# Patient Record
Sex: MALE | Race: WHITE | Employment: FULL TIME | URBAN - METROPOLITAN AREA
[De-identification: names, ages, dates, MRNs, and addresses within clinical notes are randomized per-mention and may not be internally consistent; named-entity substitution may affect disease eponyms.]

---

## 2017-01-12 ENCOUNTER — OFFICE VISIT (OUTPATIENT)
Dept: INTERNAL MEDICINE CLINIC | Facility: CLINIC | Age: 22
End: 2017-01-12

## 2017-01-12 VITALS
SYSTOLIC BLOOD PRESSURE: 118 MMHG | BODY MASS INDEX: 31.01 KG/M2 | RESPIRATION RATE: 16 BRPM | HEART RATE: 68 BPM | TEMPERATURE: 99 F | WEIGHT: 221.5 LBS | DIASTOLIC BLOOD PRESSURE: 78 MMHG | HEIGHT: 71 IN

## 2017-01-12 DIAGNOSIS — Z00.00 ROUTINE GENERAL MEDICAL EXAMINATION AT A HEALTH CARE FACILITY: Primary | ICD-10-CM

## 2017-01-12 PROCEDURE — 99385 PREV VISIT NEW AGE 18-39: CPT | Performed by: INTERNAL MEDICINE

## 2017-01-12 NOTE — PROGRESS NOTES
Marie untapt  1995 is a 24year old male. Patient presents with:  Physical      HPI:   Establishing MD    No current outpatient prescriptions on file.    Past Medical History:   Diagnosis Date   • Calculus of kidney       Social History:  Chai urinary incontinence/no history of kidney disease or genital abnormalities. no Dysuria. Nocturia None. Musculoskeletal:   Patient denies arthritis , back pain, muscle weakness . Joint pain none. Joint stiffness none.   History of comminuted fracture right Testicles: unremarkable, normal-size, without masses, non tender. EXTREMITIES:   Clubbing: none. Cyanosis: absent . Edema: none. Pulses: present. Tremors: no.   Varicose veins: absent .    MUSCULOSKELETAL:   Cervical spines: normal.   L-S spines

## 2017-01-14 ENCOUNTER — LAB ENCOUNTER (OUTPATIENT)
Dept: LAB | Age: 22
End: 2017-01-14
Attending: INTERNAL MEDICINE
Payer: COMMERCIAL

## 2017-01-14 DIAGNOSIS — Z00.00 ROUTINE GENERAL MEDICAL EXAMINATION AT A HEALTH CARE FACILITY: ICD-10-CM

## 2017-01-14 LAB
ALBUMIN SERPL-MCNC: 4.9 G/DL (ref 3.5–4.8)
ALP LIVER SERPL-CCNC: 86 U/L (ref 45–117)
ALT SERPL-CCNC: 36 U/L (ref 17–63)
AST SERPL-CCNC: 17 U/L (ref 15–41)
BASOPHILS # BLD AUTO: 0.07 X10(3) UL (ref 0–0.1)
BASOPHILS NFR BLD AUTO: 1.2 %
BILIRUB SERPL-MCNC: 2.2 MG/DL (ref 0.1–2)
BILIRUB UR QL STRIP.AUTO: NEGATIVE
BUN BLD-MCNC: 10 MG/DL (ref 8–20)
CALCIUM BLD-MCNC: 8.5 MG/DL (ref 8.3–10.3)
CHLORIDE: 104 MMOL/L (ref 101–111)
CHOLEST SMN-MCNC: 111 MG/DL (ref ?–190)
CLARITY UR REFRACT.AUTO: CLEAR
CO2: 31 MMOL/L (ref 22–32)
COLOR UR AUTO: YELLOW
CREAT BLD-MCNC: 1.11 MG/DL (ref 0.7–1.3)
EOSINOPHIL # BLD AUTO: 0.11 X10(3) UL (ref 0–0.3)
EOSINOPHIL NFR BLD AUTO: 1.8 %
ERYTHROCYTE [DISTWIDTH] IN BLOOD BY AUTOMATED COUNT: 12.5 % (ref 11.5–16)
EST. AVERAGE GLUCOSE BLD GHB EST-MCNC: 91 MG/DL (ref 68–126)
GLUCOSE BLD-MCNC: 85 MG/DL (ref 70–99)
GLUCOSE UR STRIP.AUTO-MCNC: NEGATIVE MG/DL
HBA1C MFR BLD HPLC: 4.8 % (ref ?–5.7)
HCT VFR BLD AUTO: 49.5 % (ref 37–53)
HDLC SERPL-MCNC: 40 MG/DL (ref 45–?)
HDLC SERPL: 2.78 {RATIO} (ref ?–4.97)
HGB BLD-MCNC: 16.8 G/DL (ref 13–17)
IMMATURE GRANULOCYTE COUNT: 0.02 X10(3) UL (ref 0–1)
IMMATURE GRANULOCYTE RATIO %: 0.3 %
LDLC SERPL CALC-MCNC: 53 MG/DL (ref ?–120)
LEUKOCYTE ESTERASE UR QL STRIP.AUTO: NEGATIVE
LYMPHOCYTES # BLD AUTO: 2.09 X10(3) UL (ref 0.9–4)
LYMPHOCYTES NFR BLD AUTO: 34.7 %
M PROTEIN MFR SERPL ELPH: 7.2 G/DL (ref 6.1–8.3)
MCH RBC QN AUTO: 29.6 PG (ref 27–33.2)
MCHC RBC AUTO-ENTMCNC: 33.9 G/DL (ref 31–37)
MCV RBC AUTO: 87.3 FL (ref 80–99)
MONOCYTES # BLD AUTO: 0.56 X10(3) UL (ref 0.1–0.6)
MONOCYTES NFR BLD AUTO: 9.3 %
NEUTROPHIL ABS PRELIM: 3.18 X10 (3) UL (ref 1.3–6.7)
NEUTROPHILS # BLD AUTO: 3.18 X10(3) UL (ref 1.3–6.7)
NEUTROPHILS NFR BLD AUTO: 52.7 %
NITRITE UR QL STRIP.AUTO: NEGATIVE
NONHDLC SERPL-MCNC: 71 MG/DL (ref ?–150)
PH UR STRIP.AUTO: 7 [PH] (ref 4.5–8)
PLATELET # BLD AUTO: 236 10(3)UL (ref 150–450)
POTASSIUM SERPL-SCNC: 3.7 MMOL/L (ref 3.6–5.1)
PROT UR STRIP.AUTO-MCNC: NEGATIVE MG/DL
RBC # BLD AUTO: 5.67 X10(6)UL (ref 4.3–5.7)
RBC UR QL AUTO: NEGATIVE
RED CELL DISTRIBUTION WIDTH-SD: 39.3 FL (ref 35.1–46.3)
SODIUM SERPL-SCNC: 141 MMOL/L (ref 136–144)
SP GR UR STRIP.AUTO: 1.02 (ref 1–1.03)
THYROXINE (T4): 14.3 UG/DL (ref 4.5–10.9)
TRIGLYCERIDES: 92 MG/DL (ref ?–115)
TSI SER-ACNC: 0.78 MIU/ML (ref 0.35–5.5)
UROBILINOGEN UR STRIP.AUTO-MCNC: 4 MG/DL
VLDL: 18 MG/DL (ref 5–40)
WBC # BLD AUTO: 6 X10(3) UL (ref 4–13)

## 2017-01-14 PROCEDURE — 85025 COMPLETE CBC W/AUTO DIFF WBC: CPT

## 2017-01-14 PROCEDURE — 36415 COLL VENOUS BLD VENIPUNCTURE: CPT

## 2017-01-14 PROCEDURE — 84436 ASSAY OF TOTAL THYROXINE: CPT

## 2017-01-14 PROCEDURE — 80053 COMPREHEN METABOLIC PANEL: CPT

## 2017-01-14 PROCEDURE — 80061 LIPID PANEL: CPT

## 2017-01-14 PROCEDURE — 81003 URINALYSIS AUTO W/O SCOPE: CPT

## 2017-01-14 PROCEDURE — 84443 ASSAY THYROID STIM HORMONE: CPT

## 2017-01-14 PROCEDURE — 83036 HEMOGLOBIN GLYCOSYLATED A1C: CPT

## 2017-01-18 DIAGNOSIS — R79.89 ABNORMAL THYROID BLOOD TEST: Primary | ICD-10-CM

## 2017-05-31 ENCOUNTER — TELEPHONE (OUTPATIENT)
Dept: INTERNAL MEDICINE CLINIC | Facility: CLINIC | Age: 22
End: 2017-05-31

## 2017-05-31 NOTE — TELEPHONE ENCOUNTER
Let pt know that per his fathers request -if he is agreeable -he can see a psychiatrist for asperger syndrome  something that is best handled by them   Please put in an order

## 2017-06-02 ENCOUNTER — APPOINTMENT (OUTPATIENT)
Dept: GENERAL RADIOLOGY | Age: 22
End: 2017-06-02
Attending: NURSE PRACTITIONER
Payer: COMMERCIAL

## 2017-06-02 ENCOUNTER — HOSPITAL ENCOUNTER (OUTPATIENT)
Age: 22
Discharge: HOME | End: 2017-06-02
Payer: COMMERCIAL

## 2017-06-02 VITALS
HEART RATE: 75 BPM | TEMPERATURE: 98 F | BODY MASS INDEX: 30.8 KG/M2 | WEIGHT: 220 LBS | SYSTOLIC BLOOD PRESSURE: 121 MMHG | RESPIRATION RATE: 20 BRPM | DIASTOLIC BLOOD PRESSURE: 62 MMHG | HEIGHT: 71 IN | OXYGEN SATURATION: 98 %

## 2017-06-02 DIAGNOSIS — J06.9 VIRAL URI WITH COUGH: Primary | ICD-10-CM

## 2017-06-02 PROCEDURE — 71020 XR CHEST PA + LAT CHEST (CPT=71020): CPT | Performed by: NURSE PRACTITIONER

## 2017-06-02 PROCEDURE — 99213 OFFICE O/P EST LOW 20 MIN: CPT

## 2017-06-02 PROCEDURE — 99204 OFFICE O/P NEW MOD 45 MIN: CPT

## 2017-06-02 RX ORDER — BENZONATATE 100 MG/1
100 CAPSULE ORAL 3 TIMES DAILY PRN
Qty: 30 CAPSULE | Refills: 0 | Status: SHIPPED | OUTPATIENT
Start: 2017-06-02 | End: 2017-07-02

## 2017-06-02 RX ORDER — ALBUTEROL SULFATE 90 UG/1
2 AEROSOL, METERED RESPIRATORY (INHALATION) EVERY 4 HOURS PRN
Qty: 1 INHALER | Refills: 0 | Status: SHIPPED | OUTPATIENT
Start: 2017-06-02 | End: 2017-07-02

## 2017-06-02 NOTE — ED INITIAL ASSESSMENT (HPI)
Pt presents to the immediate care due to cough for the last week. Pt states he is having yellow to clear sputum production. Denies fever.

## 2017-06-03 NOTE — ED PROVIDER NOTES
Patient Seen in: [facility] [facility]    History   Patient presents with:  Cough/URI    Stated Complaint: cough    HPI  21-year-old male who presents to the immediate care with complaints of cough for 1 week with no known fevers.   Patient for dizziness and headaches. Hematological: Negative. Psychiatric/Behavioral: Negative. All other systems reviewed and are negative. Positive for stated complaint: cough  Other systems are as noted in HPI.   Constitutional and vital signs revie STATED HISTORY: (As transcribed by Technologist)  Patient states that he has had a productive cough for one week. FINDINGS:  LUNGS:  No focal consolidation. Normal vascularity. CARDIAC:  Normal size cardiac silhouette.  MEDIASTINUM:  Normal. PLEURA:  No

## 2017-07-24 ENCOUNTER — LAB ENCOUNTER (OUTPATIENT)
Dept: LAB | Age: 22
End: 2017-07-24
Attending: INTERNAL MEDICINE
Payer: COMMERCIAL

## 2017-07-24 ENCOUNTER — OFFICE VISIT (OUTPATIENT)
Dept: INTERNAL MEDICINE CLINIC | Facility: CLINIC | Age: 22
End: 2017-07-24

## 2017-07-24 VITALS
WEIGHT: 195 LBS | SYSTOLIC BLOOD PRESSURE: 122 MMHG | OXYGEN SATURATION: 97 % | DIASTOLIC BLOOD PRESSURE: 82 MMHG | RESPIRATION RATE: 16 BRPM | BODY MASS INDEX: 27.92 KG/M2 | HEART RATE: 109 BPM | HEIGHT: 70 IN

## 2017-07-24 DIAGNOSIS — R79.89 ABNORMAL THYROID BLOOD TEST: ICD-10-CM

## 2017-07-24 DIAGNOSIS — Z13.30 SCREENING FOR MENTAL DISEASE/DEVELOPMENTAL DISORDER: ICD-10-CM

## 2017-07-24 DIAGNOSIS — R79.89 ABNORMAL THYROID BLOOD TEST: Primary | ICD-10-CM

## 2017-07-24 DIAGNOSIS — Z13.42 SCREENING FOR MENTAL DISEASE/DEVELOPMENTAL DISORDER: ICD-10-CM

## 2017-07-24 LAB
FREE T4: 1.6 NG/DL (ref 0.9–1.8)
T3FREE SERPL-MCNC: 3.4 PG/ML (ref 2.3–4.2)
TSI SER-ACNC: 0.56 MIU/ML (ref 0.35–5.5)

## 2017-07-24 PROCEDURE — 84439 ASSAY OF FREE THYROXINE: CPT

## 2017-07-24 PROCEDURE — 36415 COLL VENOUS BLD VENIPUNCTURE: CPT

## 2017-07-24 PROCEDURE — 84481 FREE ASSAY (FT-3): CPT

## 2017-07-24 PROCEDURE — 84443 ASSAY THYROID STIM HORMONE: CPT

## 2017-07-24 PROCEDURE — 99213 OFFICE O/P EST LOW 20 MIN: CPT | Performed by: INTERNAL MEDICINE

## 2017-07-24 NOTE — PROGRESS NOTES
Junior Scott  1995 is a 24year old male. Patient presents with: Follow - Up      HPI:   Here for lab discussion. No current outpatient prescriptions on file.    Past Medical History:   Diagnosis Date   • Calculus of kidney       Social Hi

## 2018-02-05 ENCOUNTER — TELEPHONE (OUTPATIENT)
Dept: INTERNAL MEDICINE CLINIC | Facility: CLINIC | Age: 23
End: 2018-02-05

## 2018-02-05 NOTE — TELEPHONE ENCOUNTER
Patient's father called - he was transferred by [facility] [place] Referral Dept to us for a behavioral health referral.  He was seen by Dr Fede Encarnacion and recommended to be tested for autism; requested a referral to [facility] in chart.   Please call father when approved-did

## 2018-02-05 NOTE — TELEPHONE ENCOUNTER
Jody Monson stated that he is looking for psych referral to Dr Ann Basilio through [facility]. Informed that psych referrals are not in need of referrals and he can contact Dr Scott Richards office to make sure he is in his network and then schedule appointment.

## 2018-03-14 NOTE — TELEPHONE ENCOUNTER
Patient's father informed waiting for Dr. Tan Hudson to [facility] referral, will call tomorrow with update.  Perry Simon (father) verbalized understanding

## 2018-03-14 NOTE — TELEPHONE ENCOUNTER
Patient's Father (T: [phone])  calling in, stated he scheduled an appointment for his Son Sheba Roy to see Dr. Libby Stein, however Dr. James Dubois office is requesting a referral, since it is psych related vs behavioral health.       Please call pt's Father

## 2018-03-15 NOTE — TELEPHONE ENCOUNTER
Left detailed message, order signed by Dr. Doroteo Pleitez, , proceed with appointment with Dr. Timmy Roy via

## 2018-06-05 NOTE — PROGRESS NOTES
Demetrio Charlton  1995 is a 20year old male. No chief complaint on file. Patient here for referral to see a psychiatrist did see a psychologist those records are not available for review.   Apparently has been diagnosed with depression and a

## 2018-07-05 PROBLEM — Z86.73 HISTORY OF TRANSIENT ISCHEMIC ATTACK (TIA): Status: ACTIVE | Noted: 2018-07-03

## 2018-07-05 PROBLEM — F34.1 PERSISTENT DEPRESSIVE DISORDER: Status: ACTIVE | Noted: 2018-07-03

## 2018-07-05 PROBLEM — F84.0 AUTISM SPECTRUM DISORDER WITHOUT ACCOMPANYING INTELLECTUAL IMPAIRMENT, REQUIRING SUPPORT (LEVEL 1): Status: ACTIVE | Noted: 2018-07-03

## 2018-07-05 PROBLEM — F32.9 MAJOR DEPRESSIVE DISORDER: Status: ACTIVE | Noted: 2018-07-03

## 2018-07-05 PROBLEM — F84.0: Status: ACTIVE | Noted: 2018-07-03

## 2018-07-05 PROBLEM — F80.82 SOCIAL PRAGMATIC COMMUNICATION DISORDER: Status: ACTIVE | Noted: 2018-07-03

## 2018-07-10 ENCOUNTER — APPOINTMENT (OUTPATIENT)
Dept: LAB | Age: 23
End: 2018-07-10
Attending: INTERNAL MEDICINE
Payer: COMMERCIAL

## 2018-07-10 PROCEDURE — 36415 COLL VENOUS BLD VENIPUNCTURE: CPT | Performed by: PHYSICIAN ASSISTANT

## 2018-07-10 PROCEDURE — 85025 COMPLETE CBC W/AUTO DIFF WBC: CPT | Performed by: PHYSICIAN ASSISTANT

## 2018-07-10 PROCEDURE — 84439 ASSAY OF FREE THYROXINE: CPT | Performed by: PHYSICIAN ASSISTANT

## 2018-07-10 PROCEDURE — 84443 ASSAY THYROID STIM HORMONE: CPT | Performed by: PHYSICIAN ASSISTANT

## 2018-07-10 PROCEDURE — 82306 VITAMIN D 25 HYDROXY: CPT | Performed by: PHYSICIAN ASSISTANT

## 2018-07-10 PROCEDURE — 80053 COMPREHEN METABOLIC PANEL: CPT | Performed by: PHYSICIAN ASSISTANT

## 2018-07-16 PROBLEM — R79.89 LOW VITAMIN D LEVEL: Status: ACTIVE | Noted: 2018-07-16

## 2018-09-20 ENCOUNTER — APPOINTMENT (OUTPATIENT)
Dept: LAB | Age: 23
End: 2018-09-20
Attending: PHYSICIAN ASSISTANT
Payer: COMMERCIAL

## 2018-09-20 PROCEDURE — 82306 VITAMIN D 25 HYDROXY: CPT | Performed by: PHYSICIAN ASSISTANT

## 2018-09-20 PROCEDURE — 36415 COLL VENOUS BLD VENIPUNCTURE: CPT | Performed by: PHYSICIAN ASSISTANT

## 2019-01-10 ENCOUNTER — OFFICE VISIT (OUTPATIENT)
Dept: INTERNAL MEDICINE CLINIC | Facility: CLINIC | Age: 24
End: 2019-01-10
Payer: COMMERCIAL

## 2019-01-10 VITALS
RESPIRATION RATE: 16 BRPM | TEMPERATURE: 99 F | HEART RATE: 95 BPM | OXYGEN SATURATION: 98 % | DIASTOLIC BLOOD PRESSURE: 78 MMHG | SYSTOLIC BLOOD PRESSURE: 112 MMHG | WEIGHT: 195.25 LBS | HEIGHT: 70 IN | BODY MASS INDEX: 27.95 KG/M2

## 2019-01-10 DIAGNOSIS — R05.9 COUGH: Primary | ICD-10-CM

## 2019-01-10 PROCEDURE — 99213 OFFICE O/P EST LOW 20 MIN: CPT | Performed by: INTERNAL MEDICINE

## 2019-01-10 RX ORDER — LEVOFLOXACIN 500 MG/1
500 TABLET, FILM COATED ORAL DAILY
Qty: 10 TABLET | Refills: 0 | Status: SHIPPED | OUTPATIENT
Start: 2019-01-10 | End: 2019-01-20

## 2019-01-10 RX ORDER — CODEINE PHOSPHATE AND GUAIFENESIN 10; 100 MG/5ML; MG/5ML
5 SOLUTION ORAL EVERY 6 HOURS PRN
Qty: 240 ML | Refills: 0 | Status: SHIPPED | OUTPATIENT
Start: 2019-01-10 | End: 2019-01-20

## 2019-01-10 RX ORDER — PREDNISONE 1 MG/1
TABLET ORAL
Qty: 36 TABLET | Refills: 0 | Status: SHIPPED | OUTPATIENT
Start: 2019-01-10 | End: 2019-04-02 | Stop reason: ALTCHOICE

## 2019-01-10 NOTE — PROGRESS NOTES
Adonis Torrez  1995 is a 24year old male who presents for upper respiratory symptoms    Patient presents with:  Cough        HPI:   Pt reports  respiratory symptoms for 2 days cough with yellowish expectoration yellowish nasal discharge  .        C GENERAL: well developed, well nourished,in no apparent distress  SKIN: no rashes,no suspicious lesions  EYES:PERRLA, EOMI intact,conjunctiva are clear  ENT: ears neg throat postynasal drip  NECK: supple, neg adenopathy,no bruits  LUNGS: clear to ausculta

## 2019-06-26 ENCOUNTER — APPOINTMENT (OUTPATIENT)
Dept: LAB | Age: 24
End: 2019-06-26
Attending: PHYSICIAN ASSISTANT
Payer: COMMERCIAL

## 2019-06-26 DIAGNOSIS — E55.9 VITAMIN D DEFICIENCY: ICD-10-CM

## 2019-06-26 PROCEDURE — 82306 VITAMIN D 25 HYDROXY: CPT

## 2019-06-26 PROCEDURE — 36415 COLL VENOUS BLD VENIPUNCTURE: CPT

## 2019-12-06 ENCOUNTER — LAB ENCOUNTER (OUTPATIENT)
Dept: LAB | Age: 24
End: 2019-12-06
Attending: INTERNAL MEDICINE
Payer: COMMERCIAL

## 2019-12-06 DIAGNOSIS — R79.89 LOW VITAMIN D LEVEL: ICD-10-CM

## 2019-12-06 PROCEDURE — 82306 VITAMIN D 25 HYDROXY: CPT

## 2019-12-06 PROCEDURE — 36415 COLL VENOUS BLD VENIPUNCTURE: CPT

## 2019-12-06 NOTE — PROGRESS NOTES
Vitamin D has normalized with vitamin D repletion. Writer will call the patient to inform him of the result.

## 2020-02-04 PROBLEM — Z86.39 HISTORY OF VITAMIN D DEFICIENCY: Status: ACTIVE | Noted: 2018-07-16

## 2022-04-27 PROBLEM — F34.1 PERSISTENT DEPRESSIVE DISORDER: Chronic | Status: ACTIVE | Noted: 2018-07-03

## 2022-04-27 PROBLEM — F84.0 AUTISM SPECTRUM DISORDER WITHOUT ACCOMPANYING INTELLECTUAL IMPAIRMENT, REQUIRING SUPPORT (LEVEL 1): Chronic | Status: ACTIVE | Noted: 2018-07-03

## 2022-04-27 PROBLEM — F84.0: Chronic | Status: ACTIVE | Noted: 2018-07-03

## 2022-04-27 PROBLEM — F32.9 MAJOR DEPRESSIVE DISORDER: Chronic | Status: ACTIVE | Noted: 2018-07-03

## 2022-04-27 PROBLEM — F41.9 ANXIETY: Status: ACTIVE | Noted: 2022-04-27

## 2022-08-24 PROBLEM — F84.0 AUTISM SPECTRUM DISORDER: Chronic | Status: ACTIVE | Noted: 2018-07-03

## 2022-08-24 PROBLEM — F84.0 AUTISM SPECTRUM DISORDER (HCC): Chronic | Status: ACTIVE | Noted: 2018-07-03

## 2022-08-24 PROBLEM — F84.0 AUTISM SPECTRUM DISORDER: Status: ACTIVE | Noted: 2018-07-03

## 2022-08-24 PROBLEM — F84.0 AUTISM SPECTRUM DISORDER (HCC): Status: ACTIVE | Noted: 2018-07-03

## 2023-03-16 ENCOUNTER — OFFICE VISIT (OUTPATIENT)
Dept: INTERNAL MEDICINE CLINIC | Facility: CLINIC | Age: 28
End: 2023-03-16
Payer: COMMERCIAL

## 2023-03-16 VITALS
OXYGEN SATURATION: 99 % | WEIGHT: 247.81 LBS | RESPIRATION RATE: 16 BRPM | BODY MASS INDEX: 34.69 KG/M2 | DIASTOLIC BLOOD PRESSURE: 70 MMHG | TEMPERATURE: 97 F | SYSTOLIC BLOOD PRESSURE: 134 MMHG | HEIGHT: 71 IN | HEART RATE: 91 BPM

## 2023-03-16 DIAGNOSIS — R23.8 SKIN IRRITATION: Primary | ICD-10-CM

## 2023-03-16 PROCEDURE — 99213 OFFICE O/P EST LOW 20 MIN: CPT | Performed by: INTERNAL MEDICINE

## 2023-03-16 PROCEDURE — 3008F BODY MASS INDEX DOCD: CPT | Performed by: INTERNAL MEDICINE

## 2023-03-16 PROCEDURE — 3078F DIAST BP <80 MM HG: CPT | Performed by: INTERNAL MEDICINE

## 2023-03-16 PROCEDURE — 3075F SYST BP GE 130 - 139MM HG: CPT | Performed by: INTERNAL MEDICINE

## 2023-03-16 RX ORDER — TRIAMCINOLONE ACETONIDE 1 MG/G
CREAM TOPICAL 2 TIMES DAILY
Qty: 80 G | Refills: 1 | Status: SHIPPED | OUTPATIENT
Start: 2023-03-16

## 2023-03-16 NOTE — PATIENT INSTRUCTIONS
- Apply triamcinolone cream to the shin and ear areas twice daily  - Follow up with Dermatology (Dr. Elizabeth Patel or Dr. Kieran Reese - see printouts). I would follow up with them even if the cream helps/the rashes go away. It was a pleasure seeing you in the clinic today. Thank you for choosing the [facility] office for your healthcare needs. Please call at [phone] with any questions or concerns.     Maame Bentley MD

## 2023-03-24 ENCOUNTER — OFFICE VISIT (OUTPATIENT)
Dept: INTERNAL MEDICINE CLINIC | Facility: CLINIC | Age: 28
End: 2023-03-24
Payer: COMMERCIAL

## 2023-03-24 ENCOUNTER — LAB ENCOUNTER (OUTPATIENT)
Dept: LAB | Age: 28
End: 2023-03-24
Attending: INTERNAL MEDICINE
Payer: COMMERCIAL

## 2023-03-24 VITALS
DIASTOLIC BLOOD PRESSURE: 80 MMHG | TEMPERATURE: 98 F | BODY MASS INDEX: 34.49 KG/M2 | HEART RATE: 82 BPM | WEIGHT: 246.38 LBS | HEIGHT: 70.75 IN | OXYGEN SATURATION: 98 % | SYSTOLIC BLOOD PRESSURE: 114 MMHG | RESPIRATION RATE: 16 BRPM

## 2023-03-24 DIAGNOSIS — G47.33 OSA (OBSTRUCTIVE SLEEP APNEA): Primary | ICD-10-CM

## 2023-03-24 DIAGNOSIS — E66.9 OBESITY (BMI 30-39.9): ICD-10-CM

## 2023-03-24 DIAGNOSIS — Z00.00 LABORATORY EXAMINATION ORDERED AS PART OF A ROUTINE GENERAL MEDICAL EXAMINATION: ICD-10-CM

## 2023-03-24 DIAGNOSIS — G47.33 OSA (OBSTRUCTIVE SLEEP APNEA): ICD-10-CM

## 2023-03-24 LAB
ALBUMIN SERPL-MCNC: 4.4 G/DL (ref 3.4–5)
ALBUMIN/GLOB SERPL: 1.5 {RATIO} (ref 1–2)
ALP LIVER SERPL-CCNC: 98 U/L
ALT SERPL-CCNC: 52 U/L
ANION GAP SERPL CALC-SCNC: 6 MMOL/L (ref 0–18)
AST SERPL-CCNC: 17 U/L (ref 15–37)
BASOPHILS # BLD AUTO: 0.1 X10(3) UL (ref 0–0.2)
BASOPHILS NFR BLD AUTO: 1.1 %
BILIRUB SERPL-MCNC: 1 MG/DL (ref 0.1–2)
BILIRUB UR QL STRIP.AUTO: NEGATIVE
BUN BLD-MCNC: 15 MG/DL (ref 7–18)
CALCIUM BLD-MCNC: 8.9 MG/DL (ref 8.5–10.1)
CHLORIDE SERPL-SCNC: 108 MMOL/L (ref 98–112)
CHOLEST SERPL-MCNC: 155 MG/DL (ref ?–200)
CLARITY UR REFRACT.AUTO: CLEAR
CO2 SERPL-SCNC: 29 MMOL/L (ref 21–32)
COLOR UR AUTO: YELLOW
CREAT BLD-MCNC: 1.11 MG/DL
EOSINOPHIL # BLD AUTO: 0.51 X10(3) UL (ref 0–0.7)
EOSINOPHIL NFR BLD AUTO: 5.8 %
ERYTHROCYTE [DISTWIDTH] IN BLOOD BY AUTOMATED COUNT: 12.4 %
EST. AVERAGE GLUCOSE BLD GHB EST-MCNC: 97 MG/DL (ref 68–126)
FASTING PATIENT LIPID ANSWER: YES
FASTING STATUS PATIENT QL REPORTED: YES
GFR SERPLBLD BASED ON 1.73 SQ M-ARVRAT: 93 ML/MIN/1.73M2 (ref 60–?)
GLOBULIN PLAS-MCNC: 2.9 G/DL (ref 2.8–4.4)
GLUCOSE BLD-MCNC: 109 MG/DL (ref 70–99)
GLUCOSE UR STRIP.AUTO-MCNC: NEGATIVE MG/DL
HBA1C MFR BLD: 5 % (ref ?–5.7)
HCT VFR BLD AUTO: 49.5 %
HDLC SERPL-MCNC: 40 MG/DL (ref 40–59)
HGB BLD-MCNC: 16.4 G/DL
IMM GRANULOCYTES # BLD AUTO: 0.03 X10(3) UL (ref 0–1)
IMM GRANULOCYTES NFR BLD: 0.3 %
INSULIN SERPL-ACNC: 20.5 MU/L (ref 3–25)
KETONES UR STRIP.AUTO-MCNC: NEGATIVE MG/DL
LDLC SERPL CALC-MCNC: 92 MG/DL (ref ?–100)
LEUKOCYTE ESTERASE UR QL STRIP.AUTO: NEGATIVE
LYMPHOCYTES # BLD AUTO: 2.36 X10(3) UL (ref 1–4)
LYMPHOCYTES NFR BLD AUTO: 26.7 %
MCH RBC QN AUTO: 28.7 PG (ref 26–34)
MCHC RBC AUTO-ENTMCNC: 33.1 G/DL (ref 31–37)
MCV RBC AUTO: 86.7 FL
MONOCYTES # BLD AUTO: 0.85 X10(3) UL (ref 0.1–1)
MONOCYTES NFR BLD AUTO: 9.6 %
NEUTROPHILS # BLD AUTO: 5 X10 (3) UL (ref 1.5–7.7)
NEUTROPHILS # BLD AUTO: 5 X10(3) UL (ref 1.5–7.7)
NEUTROPHILS NFR BLD AUTO: 56.5 %
NITRITE UR QL STRIP.AUTO: NEGATIVE
NONHDLC SERPL-MCNC: 115 MG/DL (ref ?–130)
OSMOLALITY SERPL CALC.SUM OF ELEC: 297 MOSM/KG (ref 275–295)
PH UR STRIP.AUTO: 5 [PH] (ref 5–8)
PLATELET # BLD AUTO: 243 10(3)UL (ref 150–450)
POTASSIUM SERPL-SCNC: 3.8 MMOL/L (ref 3.5–5.1)
PROT SERPL-MCNC: 7.3 G/DL (ref 6.4–8.2)
PROT UR STRIP.AUTO-MCNC: NEGATIVE MG/DL
RBC # BLD AUTO: 5.71 X10(6)UL
RBC UR QL AUTO: NEGATIVE
SODIUM SERPL-SCNC: 143 MMOL/L (ref 136–145)
SP GR UR STRIP.AUTO: 1.03 (ref 1–1.03)
TRIGL SERPL-MCNC: 129 MG/DL (ref 30–149)
TSI SER-ACNC: 2.01 MIU/ML (ref 0.36–3.74)
UROBILINOGEN UR STRIP.AUTO-MCNC: 4 MG/DL
VIT D+METAB SERPL-MCNC: 18.9 NG/ML (ref 30–100)
VLDLC SERPL CALC-MCNC: 21 MG/DL (ref 0–30)
WBC # BLD AUTO: 8.9 X10(3) UL (ref 4–11)

## 2023-03-24 PROCEDURE — 84443 ASSAY THYROID STIM HORMONE: CPT

## 2023-03-24 PROCEDURE — 81003 URINALYSIS AUTO W/O SCOPE: CPT

## 2023-03-24 PROCEDURE — 82306 VITAMIN D 25 HYDROXY: CPT

## 2023-03-24 PROCEDURE — 83036 HEMOGLOBIN GLYCOSYLATED A1C: CPT

## 2023-03-24 PROCEDURE — 80061 LIPID PANEL: CPT

## 2023-03-24 PROCEDURE — 80053 COMPREHEN METABOLIC PANEL: CPT

## 2023-03-24 PROCEDURE — 99203 OFFICE O/P NEW LOW 30 MIN: CPT | Performed by: INTERNAL MEDICINE

## 2023-03-24 PROCEDURE — 3074F SYST BP LT 130 MM HG: CPT | Performed by: INTERNAL MEDICINE

## 2023-03-24 PROCEDURE — 85025 COMPLETE CBC W/AUTO DIFF WBC: CPT

## 2023-03-24 PROCEDURE — 3079F DIAST BP 80-89 MM HG: CPT | Performed by: INTERNAL MEDICINE

## 2023-03-24 PROCEDURE — 36415 COLL VENOUS BLD VENIPUNCTURE: CPT

## 2023-03-24 PROCEDURE — 83525 ASSAY OF INSULIN: CPT

## 2023-03-24 PROCEDURE — 3008F BODY MASS INDEX DOCD: CPT | Performed by: INTERNAL MEDICINE

## 2023-03-24 NOTE — PATIENT INSTRUCTIONS
Patient will be seen in the office on Monday for a CPX.   As an addendum at the medical assistant result that a complete evaluation could not be done during this visit

## 2023-03-27 ENCOUNTER — OFFICE VISIT (OUTPATIENT)
Dept: INTERNAL MEDICINE CLINIC | Facility: CLINIC | Age: 28
End: 2023-03-27
Payer: COMMERCIAL

## 2023-03-27 VITALS
DIASTOLIC BLOOD PRESSURE: 88 MMHG | RESPIRATION RATE: 16 BRPM | HEIGHT: 70.75 IN | OXYGEN SATURATION: 99 % | HEART RATE: 85 BPM | BODY MASS INDEX: 34.6 KG/M2 | WEIGHT: 247.19 LBS | SYSTOLIC BLOOD PRESSURE: 130 MMHG | TEMPERATURE: 98 F

## 2023-03-27 DIAGNOSIS — G47.33 OSA (OBSTRUCTIVE SLEEP APNEA): ICD-10-CM

## 2023-03-27 DIAGNOSIS — L40.9 PSORIASIS: ICD-10-CM

## 2023-03-27 DIAGNOSIS — Z00.00 ROUTINE GENERAL MEDICAL EXAMINATION AT A HEALTH CARE FACILITY: Primary | ICD-10-CM

## 2023-03-27 DIAGNOSIS — Z86.39 HISTORY OF VITAMIN D DEFICIENCY: ICD-10-CM

## 2023-03-27 DIAGNOSIS — E66.9 OBESITY (BMI 30-39.9): ICD-10-CM

## 2023-03-27 PROCEDURE — 3079F DIAST BP 80-89 MM HG: CPT | Performed by: INTERNAL MEDICINE

## 2023-03-27 PROCEDURE — 3075F SYST BP GE 130 - 139MM HG: CPT | Performed by: INTERNAL MEDICINE

## 2023-03-27 PROCEDURE — 3008F BODY MASS INDEX DOCD: CPT | Performed by: INTERNAL MEDICINE

## 2023-03-27 PROCEDURE — 99395 PREV VISIT EST AGE 18-39: CPT | Performed by: INTERNAL MEDICINE

## 2023-07-03 ENCOUNTER — OFFICE VISIT (OUTPATIENT)
Dept: INTERNAL MEDICINE CLINIC | Facility: CLINIC | Age: 28
End: 2023-07-03
Payer: COMMERCIAL

## 2023-07-03 VITALS
RESPIRATION RATE: 16 BRPM | HEART RATE: 86 BPM | SYSTOLIC BLOOD PRESSURE: 124 MMHG | BODY MASS INDEX: 33.64 KG/M2 | OXYGEN SATURATION: 98 % | HEIGHT: 70 IN | WEIGHT: 235 LBS | DIASTOLIC BLOOD PRESSURE: 80 MMHG

## 2023-07-03 DIAGNOSIS — R73.01 IFG (IMPAIRED FASTING GLUCOSE): ICD-10-CM

## 2023-07-03 DIAGNOSIS — F32.A ANXIETY AND DEPRESSION: ICD-10-CM

## 2023-07-03 DIAGNOSIS — F41.9 ANXIETY AND DEPRESSION: ICD-10-CM

## 2023-07-03 DIAGNOSIS — E55.9 VITAMIN D DEFICIENCY: ICD-10-CM

## 2023-07-03 DIAGNOSIS — Z51.81 ENCOUNTER FOR THERAPEUTIC DRUG LEVEL MONITORING: Primary | ICD-10-CM

## 2023-07-03 DIAGNOSIS — E66.9 CLASS 1 OBESITY WITH BODY MASS INDEX (BMI) OF 33.0 TO 33.9 IN ADULT, UNSPECIFIED OBESITY TYPE, UNSPECIFIED WHETHER SERIOUS COMORBIDITY PRESENT: ICD-10-CM

## 2023-07-03 DIAGNOSIS — F84.0 AUTISM SPECTRUM DISORDER: Chronic | ICD-10-CM

## 2023-07-03 PROCEDURE — 99204 OFFICE O/P NEW MOD 45 MIN: CPT | Performed by: PHYSICIAN ASSISTANT

## 2023-07-03 PROCEDURE — 3074F SYST BP LT 130 MM HG: CPT | Performed by: PHYSICIAN ASSISTANT

## 2023-07-03 PROCEDURE — 3079F DIAST BP 80-89 MM HG: CPT | Performed by: PHYSICIAN ASSISTANT

## 2023-07-03 PROCEDURE — 3008F BODY MASS INDEX DOCD: CPT | Performed by: PHYSICIAN ASSISTANT

## 2023-10-03 ENCOUNTER — OFFICE VISIT (OUTPATIENT)
Dept: INTERNAL MEDICINE CLINIC | Facility: CLINIC | Age: 28
End: 2023-10-03
Payer: COMMERCIAL

## 2023-10-03 VITALS
BODY MASS INDEX: 32.93 KG/M2 | WEIGHT: 230 LBS | HEART RATE: 83 BPM | RESPIRATION RATE: 16 BRPM | TEMPERATURE: 98 F | OXYGEN SATURATION: 99 % | HEIGHT: 70 IN | SYSTOLIC BLOOD PRESSURE: 130 MMHG | DIASTOLIC BLOOD PRESSURE: 76 MMHG

## 2023-10-03 DIAGNOSIS — L30.8 PSORIASIFORM DERMATITIS: Primary | ICD-10-CM

## 2023-10-03 DIAGNOSIS — R23.8 SKIN IRRITATION: ICD-10-CM

## 2023-10-03 PROCEDURE — 3075F SYST BP GE 130 - 139MM HG: CPT | Performed by: INTERNAL MEDICINE

## 2023-10-03 PROCEDURE — 3008F BODY MASS INDEX DOCD: CPT | Performed by: INTERNAL MEDICINE

## 2023-10-03 PROCEDURE — 99213 OFFICE O/P EST LOW 20 MIN: CPT | Performed by: INTERNAL MEDICINE

## 2023-10-03 PROCEDURE — 3078F DIAST BP <80 MM HG: CPT | Performed by: INTERNAL MEDICINE

## 2023-10-03 RX ORDER — TRIAMCINOLONE ACETONIDE 1 MG/G
CREAM TOPICAL 2 TIMES DAILY
Qty: 80 G | Refills: 1 | Status: SHIPPED | OUTPATIENT
Start: 2023-10-03

## 2023-10-30 ENCOUNTER — OFFICE VISIT (OUTPATIENT)
Dept: INTERNAL MEDICINE CLINIC | Facility: CLINIC | Age: 28
End: 2023-10-30

## 2023-10-30 VITALS
SYSTOLIC BLOOD PRESSURE: 112 MMHG | DIASTOLIC BLOOD PRESSURE: 72 MMHG | BODY MASS INDEX: 31.78 KG/M2 | RESPIRATION RATE: 16 BRPM | HEIGHT: 70 IN | WEIGHT: 222 LBS | HEART RATE: 78 BPM

## 2023-10-30 DIAGNOSIS — F32.A ANXIETY AND DEPRESSION: ICD-10-CM

## 2023-10-30 DIAGNOSIS — E66.9 CLASS 1 OBESITY WITH BODY MASS INDEX (BMI) OF 31.0 TO 31.9 IN ADULT, UNSPECIFIED OBESITY TYPE, UNSPECIFIED WHETHER SERIOUS COMORBIDITY PRESENT: ICD-10-CM

## 2023-10-30 DIAGNOSIS — E55.9 VITAMIN D DEFICIENCY: ICD-10-CM

## 2023-10-30 DIAGNOSIS — Z51.81 ENCOUNTER FOR THERAPEUTIC DRUG LEVEL MONITORING: Primary | ICD-10-CM

## 2023-10-30 DIAGNOSIS — R73.01 IFG (IMPAIRED FASTING GLUCOSE): ICD-10-CM

## 2023-10-30 DIAGNOSIS — F41.9 ANXIETY AND DEPRESSION: ICD-10-CM

## 2023-10-30 PROCEDURE — 3074F SYST BP LT 130 MM HG: CPT | Performed by: PHYSICIAN ASSISTANT

## 2023-10-30 PROCEDURE — 99213 OFFICE O/P EST LOW 20 MIN: CPT | Performed by: PHYSICIAN ASSISTANT

## 2023-10-30 PROCEDURE — 3008F BODY MASS INDEX DOCD: CPT | Performed by: PHYSICIAN ASSISTANT

## 2023-10-30 PROCEDURE — 3078F DIAST BP <80 MM HG: CPT | Performed by: PHYSICIAN ASSISTANT

## 2024-02-23 ENCOUNTER — OFFICE VISIT (OUTPATIENT)
Dept: INTERNAL MEDICINE CLINIC | Facility: CLINIC | Age: 29
End: 2024-02-23
Payer: COMMERCIAL

## 2024-02-23 VITALS
BODY MASS INDEX: 31.07 KG/M2 | RESPIRATION RATE: 16 BRPM | WEIGHT: 217 LBS | SYSTOLIC BLOOD PRESSURE: 110 MMHG | HEIGHT: 70 IN | HEART RATE: 86 BPM | DIASTOLIC BLOOD PRESSURE: 62 MMHG

## 2024-02-23 DIAGNOSIS — F41.9 ANXIETY AND DEPRESSION: ICD-10-CM

## 2024-02-23 DIAGNOSIS — F32.A ANXIETY AND DEPRESSION: ICD-10-CM

## 2024-02-23 DIAGNOSIS — Z51.81 ENCOUNTER FOR THERAPEUTIC DRUG LEVEL MONITORING: Primary | ICD-10-CM

## 2024-02-23 DIAGNOSIS — E55.9 VITAMIN D DEFICIENCY: ICD-10-CM

## 2024-02-23 DIAGNOSIS — E66.9 CLASS 1 OBESITY WITH BODY MASS INDEX (BMI) OF 31.0 TO 31.9 IN ADULT, UNSPECIFIED OBESITY TYPE, UNSPECIFIED WHETHER SERIOUS COMORBIDITY PRESENT: ICD-10-CM

## 2024-02-23 DIAGNOSIS — R73.01 IFG (IMPAIRED FASTING GLUCOSE): ICD-10-CM

## 2024-02-23 PROCEDURE — 3008F BODY MASS INDEX DOCD: CPT | Performed by: PHYSICIAN ASSISTANT

## 2024-02-23 PROCEDURE — 3078F DIAST BP <80 MM HG: CPT | Performed by: PHYSICIAN ASSISTANT

## 2024-02-23 PROCEDURE — 3074F SYST BP LT 130 MM HG: CPT | Performed by: PHYSICIAN ASSISTANT

## 2024-02-23 PROCEDURE — 99213 OFFICE O/P EST LOW 20 MIN: CPT | Performed by: PHYSICIAN ASSISTANT

## 2024-02-23 NOTE — PROGRESS NOTES
HISTORY OF PRESENT ILLNESS  Chief Complaint   Patient presents with    Weight Check     -5       Jeremy Downs is a 28 year old male here for follow up in medical weight loss program.   -5lbs  No AOM  Denies chest pain, shortness of breath, dizziness, blurred vision, headache, paresthesia, nausea/vomiting.   Protein bar  Active throughout the day taking car of animals  Helping to build a shed  Has not been eating out as much, smaller portion size  Exercise/Activity: not great with the weather, but now that the weather is getting better is going to start walking again  Nutrition: 24 hour food log reviewed, eating regular meals, +protein  Stress: manageable  Sleep: improved, getting a lot better      Wt Readings from Last 6 Encounters:   02/23/24 217 lb (98.4 kg)   10/30/23 222 lb (100.7 kg)   10/03/23 230 lb (104.3 kg)   07/03/23 235 lb (106.6 kg)   03/27/23 247 lb 3.2 oz (112.1 kg)   03/24/23 246 lb 6.4 oz (111.8 kg)            Breakfast Lunch Dinner Snacks Fluids   Reviewed           REVIEW OF SYSTEMS  GENERAL HEALTH: feels well otherwise, denied any fevers chills or night sweats   RESPIRATORY: denies shortness of breath   CARDIOVASCULAR: denies chest pain  GI: denies abdominal pain    EXAM  /62   Pulse 86   Resp 16   Ht 5' 10\" (1.778 m)   Wt 217 lb (98.4 kg)   BMI 31.14 kg/m²   GENERAL: well developed, well nourished,in no apparent distress, A/O x3  SKIN: no rashes,no suspicious lesions  HEENT: atraumatic, normocephalic, OP-clear, PERRL  NECK: supple,no adenopathy  LUNGS: clear to auscultation bilaterally   CARDIO: RRR without murmur  EXTREMITIES: no cyanosis, no clubbing, no edema    Lab Results   Component Value Date    WBC 8.9 03/24/2023    RBC 5.71 (H) 03/24/2023    HGB 16.4 03/24/2023    HCT 49.5 03/24/2023    MCV 86.7 03/24/2023    MCH 28.7 03/24/2023    MCHC 33.1 03/24/2023    RDW 12.4 03/24/2023    .0 03/24/2023     Lab Results   Component Value Date     (H) 03/24/2023    BUN 15  03/24/2023    CREATSERUM 1.11 03/24/2023    ANIONGAP 6 03/24/2023    GFR 94 01/14/2017    GFRNAA 97 07/10/2018    GFRAA 113 07/10/2018    CA 8.9 03/24/2023    OSMOCALC 297 (H) 03/24/2023    ALKPHO 98 03/24/2023    AST 17 03/24/2023    ALT 52 03/24/2023    BILT 1.0 03/24/2023    TP 7.3 03/24/2023    ALB 4.4 03/24/2023    GLOBULIN 2.9 03/24/2023     03/24/2023    K 3.8 03/24/2023     03/24/2023    CO2 29.0 03/24/2023     Lab Results   Component Value Date    EAG 97 03/24/2023    A1C 5.0 03/24/2023     Lab Results   Component Value Date    CHOLEST 155 03/24/2023    TRIG 129 03/24/2023    HDL 40 03/24/2023    LDL 92 03/24/2023    VLDL 21 03/24/2023    TCHDLRATIO 2.78 01/14/2017    NONHDLC 115 03/24/2023     Lab Results   Component Value Date    T4F 1.3 07/10/2018    TSH 2.010 03/24/2023     No results found for: \"B12\", \"VITB12\"  Lab Results   Component Value Date    VITD 18.9 (L) 03/24/2023       Current Outpatient Medications on File Prior to Visit   Medication Sig Dispense Refill    buPROPion ER (WELLBUTRIN XL) 150 MG Oral Tablet 24 Hr Take 1 tablet (150 mg total) by mouth every morning. 90 tablet 0    Cholecalciferol 5000 units Oral Tab Take 1 tablet (5,000 Units total) by mouth daily.  0     No current facility-administered medications on file prior to visit.       ASSESSMENT  Analyzed weight data:       Diagnoses and all orders for this visit:    Encounter for therapeutic drug level monitoring    Class 1 obesity with body mass index (BMI) of 31.0 to 31.9 in adult, unspecified obesity type, unspecified whether serious comorbidity present    IFG (impaired fasting glucose)    Anxiety and depression    Vitamin D deficiency        PLAN  Initial Weight: 236lbs  Initial Weight Date: 7/3/23  Today's Weight: 217lbs  5% Goal: 11.75lbs  10% Goal: 23.5lbs  Total Weight Loss: Down 5lbs/Net loss 19lbs    Patient would like to continue to work on lifestyle changes  IFG-continue to work on lifestyle changes, low-carb  diet, possible addition of metformin  Mood is stable on current medication  Continue to focus on protein and produce this each meal and snack  Continue to work on increasing movement, discussed incorporating more strength/resistance training  Nutrition: low carb diet/ recommended to eat breakfast daily/ regular protein intake  Medication use and side effects reviewed with patient.  Medication contraindications: stimulant, topamax   Follow up with dietitian and psychologist as recommended.  Discussed the role of sleep and stress in weight management.  Counseled on comprehensive weight loss plan including attention to nutrition, exercise and behavior/stress management for success. See patient instruction below for more details.  Discussed strategies to overcome barriers to successful weight loss and weight maintenance  FITTE: ACSM recommendations (150-300 minutes/ week in active weight loss)   Weight Loss consent to treat reviewed and signed       NOTE TO PATIENT: The 21st Century Cures Act makes clinical notes like these available to patients in the interest of transparency. Clinical notes are medical documents used by physicians and care providers to communicate with each other. These documents include medical language and terminology, abbreviations, and treatment information that may sound technical and at times possibly unfamiliar. In addition, at times, the verbiage may appear blunt or direct. These documents are one tool providers use to communicate relevant information and clinical opinions of the care providers in a way that allows common understanding of the clinical context.     There are no Patient Instructions on file for this visit.    No follow-ups on file.    Patient verbalizes understanding.    Carolyen Martinez PA-C  2/23/2024

## 2024-07-29 ENCOUNTER — OFFICE VISIT (OUTPATIENT)
Facility: CLINIC | Age: 29
End: 2024-07-29
Payer: COMMERCIAL

## 2024-07-29 VITALS
SYSTOLIC BLOOD PRESSURE: 110 MMHG | HEART RATE: 80 BPM | WEIGHT: 226 LBS | DIASTOLIC BLOOD PRESSURE: 70 MMHG | RESPIRATION RATE: 18 BRPM | HEIGHT: 70 IN | BODY MASS INDEX: 32.35 KG/M2

## 2024-07-29 DIAGNOSIS — R73.01 IFG (IMPAIRED FASTING GLUCOSE): Primary | ICD-10-CM

## 2024-07-29 DIAGNOSIS — F41.9 ANXIETY AND DEPRESSION: ICD-10-CM

## 2024-07-29 DIAGNOSIS — F32.A ANXIETY AND DEPRESSION: ICD-10-CM

## 2024-07-29 DIAGNOSIS — E66.9 CLASS 1 OBESITY WITH BODY MASS INDEX (BMI) OF 32.0 TO 32.9 IN ADULT, UNSPECIFIED OBESITY TYPE, UNSPECIFIED WHETHER SERIOUS COMORBIDITY PRESENT: ICD-10-CM

## 2024-07-29 DIAGNOSIS — E55.9 VITAMIN D DEFICIENCY: ICD-10-CM

## 2024-07-29 PROCEDURE — 3078F DIAST BP <80 MM HG: CPT | Performed by: PHYSICIAN ASSISTANT

## 2024-07-29 PROCEDURE — 3008F BODY MASS INDEX DOCD: CPT | Performed by: PHYSICIAN ASSISTANT

## 2024-07-29 PROCEDURE — 3074F SYST BP LT 130 MM HG: CPT | Performed by: PHYSICIAN ASSISTANT

## 2024-07-29 PROCEDURE — 99213 OFFICE O/P EST LOW 20 MIN: CPT | Performed by: PHYSICIAN ASSISTANT

## 2024-07-29 NOTE — PROGRESS NOTES
HISTORY OF PRESENT ILLNESS  Chief Complaint   Patient presents with    Weight Check     +9lb       Jeremy Downs is a 28 year old male here for follow up in medical weight loss program.   Last OV 2/23/24  No AOM  Denies chest pain, shortness of breath, dizziness, blurred vision, headache, paresthesia, nausea/vomiting.   Working nights on the weekends now which is a change  Got rid of a few chickens  Admits could be better with food choices, portion size has gotten     Exercise/Activity: walking more, active at work  Nutrition: 24 hour food log reviewed, eating regular meals, +protein  Stress: manageable  Sleep: variable depending on work schedule      Wt Readings from Last 6 Encounters:   07/29/24 226 lb (102.5 kg)   02/23/24 217 lb (98.4 kg)   10/30/23 222 lb (100.7 kg)   10/03/23 230 lb (104.3 kg)   07/03/23 235 lb (106.6 kg)   03/27/23 247 lb 3.2 oz (112.1 kg)            Breakfast Lunch Dinner Snacks Fluids   Reviewed           REVIEW OF SYSTEMS  GENERAL HEALTH: feels well otherwise, denied any fevers chills or night sweats   RESPIRATORY: denies shortness of breath   CARDIOVASCULAR: denies chest pain  GI: denies abdominal pain    EXAM  /70   Pulse 80   Resp 18   Ht 5' 10\" (1.778 m)   Wt 226 lb (102.5 kg)   BMI 32.43 kg/m²   GENERAL: well developed, well nourished,in no apparent distress, A/O x3  SKIN: no rashes,no suspicious lesions  HEENT: atraumatic, normocephalic, OP-clear, PERRL  NECK: supple,no adenopathy  LUNGS: clear to auscultation bilaterally   CARDIO: RRR without murmur  EXTREMITIES: no cyanosis, no clubbing, no edema    Lab Results   Component Value Date    WBC 8.9 03/24/2023    RBC 5.71 (H) 03/24/2023    HGB 16.4 03/24/2023    HCT 49.5 03/24/2023    MCV 86.7 03/24/2023    MCH 28.7 03/24/2023    MCHC 33.1 03/24/2023    RDW 12.4 03/24/2023    .0 03/24/2023     Lab Results   Component Value Date     (H) 03/24/2023    BUN 15 03/24/2023    CREATSERUM 1.11 03/24/2023    ANIONGAP 6  03/24/2023    GFR 94 01/14/2017    GFRNAA 97 07/10/2018    GFRAA 113 07/10/2018    CA 8.9 03/24/2023    OSMOCALC 297 (H) 03/24/2023    ALKPHO 98 03/24/2023    AST 17 03/24/2023    ALT 52 03/24/2023    BILT 1.0 03/24/2023    TP 7.3 03/24/2023    ALB 4.4 03/24/2023    GLOBULIN 2.9 03/24/2023     03/24/2023    K 3.8 03/24/2023     03/24/2023    CO2 29.0 03/24/2023     Lab Results   Component Value Date    EAG 97 03/24/2023    A1C 5.0 03/24/2023     Lab Results   Component Value Date    CHOLEST 155 03/24/2023    TRIG 129 03/24/2023    HDL 40 03/24/2023    LDL 92 03/24/2023    VLDL 21 03/24/2023    TCHDLRATIO 2.78 01/14/2017    NONHDLC 115 03/24/2023     Lab Results   Component Value Date    T4F 1.3 07/10/2018    TSH 2.010 03/24/2023     No results found for: \"B12\", \"VITB12\"  Lab Results   Component Value Date    VITD 18.9 (L) 03/24/2023       Current Outpatient Medications on File Prior to Visit   Medication Sig Dispense Refill    buPROPion ER (WELLBUTRIN XL) 150 MG Oral Tablet 24 Hr Take 1 tablet (150 mg total) by mouth every morning. 90 tablet 0    Cholecalciferol 5000 units Oral Tab Take 1 tablet (5,000 Units total) by mouth daily.  0     No current facility-administered medications on file prior to visit.       ASSESSMENT  Analyzed weight data:       Diagnoses and all orders for this visit:    IFG (impaired fasting glucose)    Class 1 obesity with body mass index (BMI) of 32.0 to 32.9 in adult, unspecified obesity type, unspecified whether serious comorbidity present    Anxiety and depression    Vitamin D deficiency        PLAN  Initial Weight: 236lbs  Initial Weight Date: 7/3/23  Today's Weight: 226lbs  5% Goal: 11.75lbs  10% Goal: 23.5lbs  Total Weight Loss: +9lbs/Net loss 10lbs    Will continue to focus on lifestyle changes  Increase protein  Going to start walking dogs again and try to go to gym 2 days a week  IFG - low carb diet  Mood is stable on current medications  Vit D supplement, take with  food  Nutrition: low carb diet/ recommended to eat breakfast daily/ regular protein intake  Medication use and side effects reviewed with patient.  Medication contraindications: stimulant, topamax  Follow up with dietitian and psychologist as recommended.  Discussed the role of sleep and stress in weight management.  Counseled on comprehensive weight loss plan including attention to nutrition, exercise and behavior/stress management for success. See patient instruction below for more details.  Discussed strategies to overcome barriers to successful weight loss and weight maintenance  FITTE: ACSM recommendations (150-300 minutes/ week in active weight loss)   Weight Loss consent to treat reviewed and signed       NOTE TO PATIENT: The 21st Century Cures Act makes clinical notes like these available to patients in the interest of transparency. Clinical notes are medical documents used by physicians and care providers to communicate with each other. These documents include medical language and terminology, abbreviations, and treatment information that may sound technical and at times possibly unfamiliar. In addition, at times, the verbiage may appear blunt or direct. These documents are one tool providers use to communicate relevant information and clinical opinions of the care providers in a way that allows common understanding of the clinical context.     There are no Patient Instructions on file for this visit.    No follow-ups on file.    Patient verbalizes understanding.    Carolyne Martinez PA-C  7/29/2024

## 2024-11-18 ENCOUNTER — OFFICE VISIT (OUTPATIENT)
Facility: CLINIC | Age: 29
End: 2024-11-18
Payer: COMMERCIAL

## 2024-11-18 VITALS
SYSTOLIC BLOOD PRESSURE: 122 MMHG | DIASTOLIC BLOOD PRESSURE: 70 MMHG | OXYGEN SATURATION: 97 % | WEIGHT: 232 LBS | HEART RATE: 92 BPM | HEIGHT: 70 IN | RESPIRATION RATE: 16 BRPM | BODY MASS INDEX: 33.21 KG/M2

## 2024-11-18 DIAGNOSIS — E55.9 VITAMIN D DEFICIENCY: ICD-10-CM

## 2024-11-18 DIAGNOSIS — Z51.81 ENCOUNTER FOR THERAPEUTIC DRUG LEVEL MONITORING: Primary | ICD-10-CM

## 2024-11-18 DIAGNOSIS — E66.811 CLASS 1 OBESITY WITH SERIOUS COMORBIDITY AND BODY MASS INDEX (BMI) OF 33.0 TO 33.9 IN ADULT, UNSPECIFIED OBESITY TYPE: ICD-10-CM

## 2024-11-18 DIAGNOSIS — F32.A ANXIETY AND DEPRESSION: ICD-10-CM

## 2024-11-18 DIAGNOSIS — R73.01 IFG (IMPAIRED FASTING GLUCOSE): ICD-10-CM

## 2024-11-18 DIAGNOSIS — F41.9 ANXIETY AND DEPRESSION: ICD-10-CM

## 2024-11-18 PROCEDURE — 3074F SYST BP LT 130 MM HG: CPT | Performed by: PHYSICIAN ASSISTANT

## 2024-11-18 PROCEDURE — 3078F DIAST BP <80 MM HG: CPT | Performed by: PHYSICIAN ASSISTANT

## 2024-11-18 PROCEDURE — 3008F BODY MASS INDEX DOCD: CPT | Performed by: PHYSICIAN ASSISTANT

## 2024-11-18 PROCEDURE — 99213 OFFICE O/P EST LOW 20 MIN: CPT | Performed by: PHYSICIAN ASSISTANT

## 2024-11-18 NOTE — PROGRESS NOTES
HISTORY OF PRESENT ILLNESS  Chief Complaint   Patient presents with    Weight Check     +6lbs       Jeremy Downs is a 29 year old male here for follow up in medical weight loss program.   +6lbs  No AOM  Denies chest pain, shortness of breath, dizziness, blurred vision, headache, paresthesia, nausea/vomiting.   Things have been more stressful at work  A lot of changes  Struggling more with eating out  Knows he could do better with protein  Exercise/Activity: walking, taking care of animals  Nutrition: 24 hour food log reviewed, eating regular meals, +protein  Stress: higher, all related to work  Sleep: 6-8 hours/night     [facility] Follow Up    General Information  Nutrition Recall  Exercise     Sleep              Wt Readings from Last 6 Encounters:   11/18/24 232 lb (105.2 kg)   07/29/24 226 lb (102.5 kg)   02/23/24 217 lb (98.4 kg)   10/30/23 222 lb (100.7 kg)   10/03/23 230 lb (104.3 kg)   07/03/23 235 lb (106.6 kg)            Breakfast Lunch Dinner Snacks Fluids   Reviewed           REVIEW OF SYSTEMS  GENERAL HEALTH: feels well otherwise, denied any fevers chills or night sweats   RESPIRATORY: denies shortness of breath   CARDIOVASCULAR: denies chest pain  GI: denies abdominal pain    EXAM  /70   Pulse 92   Resp 16   Ht 5' 10\" (1.778 m)   Wt 232 lb (105.2 kg)   SpO2 97%   BMI 33.29 kg/m²   GENERAL: well developed, well nourished,in no apparent distress, A/O x3  SKIN: no rashes,no suspicious lesions  HEENT: atraumatic, normocephalic, OP-clear, PERRL  NECK: supple,no adenopathy  LUNGS: clear to auscultation bilaterally   CARDIO: RRR without murmur  EXTREMITIES: no cyanosis, no clubbing, no edema    Lab Results   Component Value Date    WBC 8.9 03/24/2023    RBC 5.71 (H) 03/24/2023    HGB 16.4 03/24/2023    HCT 49.5 03/24/2023    MCV 86.7 03/24/2023    MCH 28.7 03/24/2023    MCHC 33.1 03/24/2023    RDW 12.4 03/24/2023    .0 03/24/2023     Lab Results   Component Value Date     (H) 03/24/2023     BUN 15 03/24/2023    CREATSERUM 1.11 03/24/2023    ANIONGAP 6 03/24/2023    GFR 94 01/14/2017    GFRNAA 97 07/10/2018    GFRAA 113 07/10/2018    CA 8.9 03/24/2023    OSMOCALC 297 (H) 03/24/2023    ALKPHO 98 03/24/2023    AST 17 03/24/2023    ALT 52 03/24/2023    BILT 1.0 03/24/2023    TP 7.3 03/24/2023    ALB 4.4 03/24/2023    GLOBULIN 2.9 03/24/2023     03/24/2023    K 3.8 03/24/2023     03/24/2023    CO2 29.0 03/24/2023     Lab Results   Component Value Date    EAG 97 03/24/2023    A1C 5.0 03/24/2023     Lab Results   Component Value Date    CHOLEST 155 03/24/2023    TRIG 129 03/24/2023    HDL 40 03/24/2023    LDL 92 03/24/2023    VLDL 21 03/24/2023    TCHDLRATIO 2.78 01/14/2017    NONHDLC 115 03/24/2023     Lab Results   Component Value Date    T4F 1.3 07/10/2018    TSH 2.010 03/24/2023     No results found for: \"B12\", \"VITB12\"  Lab Results   Component Value Date    VITD 18.9 (L) 03/24/2023       Medications Ordered Prior to Encounter[1]    ASSESSMENT  Analyzed weight data:  Weight Calculations  Initial Weight: 236 lbs  Initial Weight Date: 07/03/23  Today's Weight: 226 lbs  5% Goal: 11.8  10% Goal: 23.6  Total Weight Loss: 10 lbs    Diagnoses and all orders for this visit:    Encounter for therapeutic drug level monitoring    Class 1 obesity with serious comorbidity and body mass index (BMI) of 33.0 to 33.9 in adult, unspecified obesity type    IFG (impaired fasting glucose)    Anxiety and depression    Vitamin D deficiency        PLAN  Initial Weight: 236lbs  Initial Weight Date: 7/3/23  Today's Weight: 232lbs  5% Goal: 11.75lbs  10% Goal: 23.5lbs  Total Weight Loss: +6lbs/Net loss 4lbs    Pt would like to continue to focus on lifestyle changes  IFG - possible addition of metformin  Mood is stable on current medications, continue to work on stress management  Vit D supplement, take with food  Consistency with logging foods - protein and produce  Incorporate strength/resistance training  Nutrition:  low carb diet/ recommended to eat breakfast daily/ regular protein intake  Medication use and side effects reviewed with patient.  Medication contraindications: stimulant, topamax  Follow up with dietitian and psychologist as recommended.  Discussed the role of sleep and stress in weight management.  Counseled on comprehensive weight loss plan including attention to nutrition, exercise and behavior/stress management for success. See patient instruction below for more details.  Discussed strategies to overcome barriers to successful weight loss and weight maintenance  FITTE: ACSM recommendations (150-300 minutes/ week in active weight loss)   Weight Loss consent to treat reviewed and signed       NOTE TO PATIENT: The 21st Century Cures Act makes clinical notes like these available to patients in the interest of transparency. Clinical notes are medical documents used by physicians and care providers to communicate with each other. These documents include medical language and terminology, abbreviations, and treatment information that may sound technical and at times possibly unfamiliar. In addition, at times, the verbiage may appear blunt or direct. These documents are one tool providers use to communicate relevant information and clinical opinions of the care providers in a way that allows common understanding of the clinical context.     There are no Patient Instructions on file for this visit.    No follow-ups on file.    Patient verbalizes understanding.    Carolyne Martinez PA-C  11/18/2024           [1]   Current Outpatient Medications on File Prior to Visit   Medication Sig Dispense Refill    buPROPion ER (WELLBUTRIN XL) 150 MG Oral Tablet 24 Hr Take 1 tablet (150 mg total) by mouth every morning. 90 tablet 0    Cholecalciferol 5000 units Oral Tab Take 1 tablet (5,000 Units total) by mouth daily.  0     No current facility-administered medications on file prior to visit.

## 2025-03-18 ENCOUNTER — TELEMEDICINE (OUTPATIENT)
Facility: CLINIC | Age: 30
End: 2025-03-18
Payer: COMMERCIAL

## 2025-03-18 DIAGNOSIS — E55.9 VITAMIN D DEFICIENCY: ICD-10-CM

## 2025-03-18 DIAGNOSIS — Z51.81 ENCOUNTER FOR THERAPEUTIC DRUG LEVEL MONITORING: Primary | ICD-10-CM

## 2025-03-18 DIAGNOSIS — R73.01 IFG (IMPAIRED FASTING GLUCOSE): ICD-10-CM

## 2025-03-18 DIAGNOSIS — F32.A ANXIETY AND DEPRESSION: ICD-10-CM

## 2025-03-18 DIAGNOSIS — F41.9 ANXIETY AND DEPRESSION: ICD-10-CM

## 2025-03-18 DIAGNOSIS — E66.811 CLASS 1 OBESITY WITH SERIOUS COMORBIDITY AND BODY MASS INDEX (BMI) OF 33.0 TO 33.9 IN ADULT, UNSPECIFIED OBESITY TYPE: ICD-10-CM

## 2025-03-18 NOTE — PROGRESS NOTES
This visit is conducted using Telemedicine with live, interactive video and audio.    Patient has been referred to the [facility] website at [email or web address] to review the yearly Consent to Treat document.    Patient understands and accepts financial responsibility for any deductible, co-insurance and/or co-pays associated with this service.      HISTORY OF PRESENT ILLNESS  Chief Complaint   Patient presents with    Weight Check       Jeremy Downs is a 29 year old male here for follow up in medical weight loss program.   Has not gotten on scale, does not have one at home  No AOM  Denies chest pain, shortness of breath, dizziness, blurred vision, headache, paresthesia, nausea/vomiting.   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, but admits outside of work has been minimal  Nutrition: 24 hour food log reviewed, eating regular meals, +protein  Stress: 5/10  Sleep: 7 hours/night     [facility] Follow Up    General Information  Success Moment: Eating less  Challenging Moment: Craving more than i need  Nutrition Recall  Breakfast: None Lunch: Either burgers or pizza   Dinner: Pasta or burgers Snacks: Chocolate, doritos, and oreos   Fluids: Water and soda Dining Out: 14   Exercise   Patient stated exercises # days/week: 5  Patient stated perceived level of   exertion: 3 Anaerobic Days: 5   Aerobic Days: 5   Patient stated average level of stress: 5  Sleep   Patient stated # hours uninterrupted sleep: 7   Patient stated feels   restful: Yes      Goals: Try to weigh either 225 or 200 even              Wt Readings from Last 6 Encounters:   11/18/24 232 lb (105.2 kg)   07/29/24 226 lb (102.5 kg)   02/23/24 217 lb (98.4 kg)    10/30/23 222 lb (100.7 kg)   10/03/23 230 lb (104.3 kg)   07/03/23 235 lb (106.6 kg)            Breakfast Lunch Dinner Snacks Fluids              REVIEW OF SYSTEMS  GENERAL HEALTH: feels well otherwise, denied any fevers chills or night sweats   RESPIRATORY: denies shortness of breath   CARDIOVASCULAR: denies chest pain  GI: denies abdominal pain    EXAM  There were no vitals taken for this visit.  GENERAL: well developed, well nourished,in no apparent distress, A/O x3  SKIN: no rashes,no suspicious lesions on visible skin  HEENT: atraumatic, normocephalic  LUNGS: no increased effort or work with breathing   NEURO: speaking fluently and in clear sentences    Lab Results   Component Value Date    WBC 8.9 03/24/2023    RBC 5.71 (H) 03/24/2023    HGB 16.4 03/24/2023    HCT 49.5 03/24/2023    MCV 86.7 03/24/2023    MCH 28.7 03/24/2023    MCHC 33.1 03/24/2023    RDW 12.4 03/24/2023    .0 03/24/2023     Lab Results   Component Value Date     (H) 03/24/2023    BUN 15 03/24/2023    CREATSERUM 1.11 03/24/2023    ANIONGAP 6 03/24/2023    GFR 94 01/14/2017    GFRNAA 97 07/10/2018    GFRAA 113 07/10/2018    CA 8.9 03/24/2023    OSMOCALC 297 (H) 03/24/2023    ALKPHO 98 03/24/2023    AST 17 03/24/2023    ALT 52 03/24/2023    BILT 1.0 03/24/2023    TP 7.3 03/24/2023    ALB 4.4 03/24/2023    GLOBULIN 2.9 03/24/2023     03/24/2023    K 3.8 03/24/2023     03/24/2023    CO2 29.0 03/24/2023     Lab Results   Component Value Date    EAG 97 03/24/2023    A1C 5.0 03/24/2023     Lab Results   Component Value Date    CHOLEST 155 03/24/2023    TRIG 129 03/24/2023    HDL 40 03/24/2023    LDL 92 03/24/2023    VLDL 21 03/24/2023    TCHDLRATIO 2.78 01/14/2017    NONHDLC 115 03/24/2023     Lab Results   Component Value Date    T4F 1.3 07/10/2018    TSH 2.010 03/24/2023     No results found for: \"B12\", \"VITB12\"  Lab Results   Component Value Date    VITD 18.9 (L) 03/24/2023       Medications Ordered Prior to  Encounter[1]    ASSESSMENT  Analyzed weight data:       Diagnoses and all orders for this visit:    Encounter for therapeutic drug level monitoring    Class 1 obesity with serious comorbidity and body mass index (BMI) of 33.0 to 33.9 in adult, unspecified obesity type    IFG (impaired fasting glucose)    Anxiety and depression    Vitamin D deficiency        PLAN  Initial Weight: 236lbs  Initial Weight Date: 7/3/23  Today's Weight: 232lbs  5% Goal: 11.75lbs  10% Goal: 23.5lbs  Total Weight Loss: +6lbs/Net loss 4lbs    Patient would like to continue to focus on lifestyle changes  IFG - discussed option of metformin, continue to work on low carb diet  Mood is stable, continue current medications  Vit D supplement, take with food  Consistency with logging foods - protein and produce  Incorporate strength/resistance training  Nutrition: low carb diet/ recommended to eat breakfast daily/ regular protein intake  Medication use and side effects reviewed with patient.  Medication contraindications: stimulant, topamax  Follow up with dietitian and psychologist as recommended.  Discussed the role of sleep and stress in weight management.  Counseled on comprehensive weight loss plan including attention to nutrition, exercise and behavior/stress management for success. See patient instruction below for more details.  Discussed strategies to overcome barriers to successful weight loss and weight maintenance  FITTE: ACSM recommendations (150-300 minutes/ week in active weight loss)   Weight Loss consent to treat reviewed and signed     There are no Patient Instructions on file for this visit.    No follow-ups on file.    Patient verbalizes understanding.    Carolyne Martinez PA-C  3/18/2025    Please note that the following visit was completed using two-way, real-time interactive audio and video communication.  This has been done in good maisha to provide continuity of care in the best interest of the provider-patient relationship,  due to the ongoing public health crisis/national emergency and because of restrictions of visitation.  There are limitations of this visit as no physical exam could be performed.  Every conscious effort was taken to allow for sufficient and adequate time.  This billing was spent on reviewing labs, medications, radiology tests and decision making.  Appropriate medical decision-making and tests are ordered as detailed in the plan of care above    Carolyne Martinez PA-C           [1]   Current Outpatient Medications on File Prior to Visit   Medication Sig Dispense Refill    buPROPion ER (WELLBUTRIN XL) 150 MG Oral Tablet 24 Hr Take 1 tablet (150 mg total) by mouth every morning. 90 tablet 0    Cholecalciferol 5000 units Oral Tab Take 1 tablet (5,000 Units total) by mouth daily.  0     No current facility-administered medications on file prior to visit.

## (undated) NOTE — MR AVS SNAPSHOT
[place]  [address]  [address] [address] [phone]               Thank you for choosing us for your health care visit with Lindsey Cerrato MD.  We are glad to serve you and happy to provide you with this styles CBC W Differential W Platelet    Complete by:  Jan 12, 2017 (Approximate)    Assoc Dx:  Routine general medical examination at a health care facility [Z00.00]           Urinalysis, Routine    Complete by:  Jan 12, 2017    Assoc Dx:  Routine general medica Tips for increasing your physical activity – Adults who are physically active are less likely to develop some chronic diseases than adults who are inactive.      HOW TO GET STARTED: HOW TO STAY MOTIVATED:   Start activities slowly and build up over time Do